# Patient Record
Sex: FEMALE
[De-identification: names, ages, dates, MRNs, and addresses within clinical notes are randomized per-mention and may not be internally consistent; named-entity substitution may affect disease eponyms.]

---

## 2021-06-24 ENCOUNTER — APPOINTMENT (OUTPATIENT)
Dept: ORTHOPEDIC SURGERY | Facility: CLINIC | Age: 41
End: 2021-06-24
Payer: COMMERCIAL

## 2021-06-24 DIAGNOSIS — J30.2 OTHER SEASONAL ALLERGIC RHINITIS: ICD-10-CM

## 2021-06-24 DIAGNOSIS — Z87.09 PERSONAL HISTORY OF OTHER DISEASES OF THE RESPIRATORY SYSTEM: ICD-10-CM

## 2021-06-24 DIAGNOSIS — Z78.9 OTHER SPECIFIED HEALTH STATUS: ICD-10-CM

## 2021-06-24 PROBLEM — Z00.00 ENCOUNTER FOR PREVENTIVE HEALTH EXAMINATION: Status: ACTIVE | Noted: 2021-06-24

## 2021-06-24 PROCEDURE — 99204 OFFICE O/P NEW MOD 45 MIN: CPT

## 2021-06-24 RX ORDER — METHYLPREDNISOLONE 4 MG/1
4 TABLET ORAL
Qty: 1 | Refills: 0 | Status: ACTIVE | COMMUNITY
Start: 2021-06-24 | End: 1900-01-01

## 2021-08-04 PROBLEM — J30.2 SEASONAL ALLERGIES: Status: RESOLVED | Noted: 2021-08-04 | Resolved: 2021-08-04

## 2021-08-04 PROBLEM — Z87.09 HISTORY OF ASTHMA: Status: RESOLVED | Noted: 2021-08-04 | Resolved: 2021-08-04

## 2021-08-04 PROBLEM — Z78.9 NON-SMOKER: Status: ACTIVE | Noted: 2021-08-04

## 2021-08-06 NOTE — PHYSICAL EXAM
[de-identified] : Physical Exam:\par \par General: patient is well developed, well nourished, in no acute \par distress, alert and oriented x 3. \par \par Mood and affect: normal\par \par Respiratory: no respiratory distress noted\par \par Skin: no scars over spine, skin intact, no erythema, increased warmth\par \par Alignment:The spine is well compensated in the coronal and sagittal plane. \par \par Gait: The patient is able to toe walk and heel walk without difficulty. \par \par Palpation: no tenderness to palpation cervical spine or paraspinal region\par \par Range of motion: Cervical spine ROM is full\par \par Neurologic Exam:\par Motor: Manual Muscle testing in the upper and lower extremities is 5 out of 5 in all muscle groups. There is no evidence of muscular atrophy in the upper extremities. Sensory: Sensation to light touch is grossly intact in the upper and lower extremities\par \par Special tests: Spurlings sign absent. \par \par \par  [de-identified] : MRI cervical 6/2021: mild multilevel degenerative changes; C5/C6 right >left posterior osteophyte with mild bilateral foraminal stenosis; no cord compression, no cord signal change

## 2021-08-06 NOTE — HISTORY OF PRESENT ILLNESS
[de-identified] : Ms. MEHTA is a very pleasant 41 year old female who complains of left shoulder and upper extremity pain for the past 4 days\par \par The patient no history of previous spine surgery.\par \par The patient has no history of unexpected weight loss, no history of active cancer, no history bladder or bowel dysfunction, no night pain, no fevers or chills.\par \par The past medical history, surgical history, family history, allergies, medications, 10+ point review of systems, family history and social history were reviewed and non contributory.\par \par

## 2021-08-06 NOTE — DISCUSSION/SUMMARY
[de-identified] : Discussed my findings with the patient. Recommending a medrol dose pack, prescription and instructions given. Follow up in 1-2 weeks if still having pain, sooner if there is an issue. All questions answered.

## 2021-10-29 RX ORDER — METHYLPREDNISOLONE 4 MG/1
4 TABLET ORAL
Qty: 1 | Refills: 0 | Status: ACTIVE | COMMUNITY
Start: 2021-10-29 | End: 1900-01-01

## 2021-11-03 ENCOUNTER — APPOINTMENT (OUTPATIENT)
Dept: ORTHOPEDIC SURGERY | Facility: CLINIC | Age: 41
End: 2021-11-03
Payer: COMMERCIAL

## 2021-11-03 DIAGNOSIS — M54.12 RADICULOPATHY, CERVICAL REGION: ICD-10-CM

## 2021-11-03 DIAGNOSIS — M48.02 SPINAL STENOSIS, CERVICAL REGION: ICD-10-CM

## 2021-11-03 PROCEDURE — 99442: CPT

## 2021-11-17 ENCOUNTER — NON-APPOINTMENT (OUTPATIENT)
Age: 41
End: 2021-11-17

## 2021-12-21 PROBLEM — M54.12 CERVICAL RADICULOPATHY: Status: ACTIVE | Noted: 2021-06-24

## 2021-12-21 PROBLEM — M48.02 FORAMINAL STENOSIS OF CERVICAL REGION: Status: ACTIVE | Noted: 2021-06-24

## 2022-04-21 ENCOUNTER — APPOINTMENT (OUTPATIENT)
Dept: ORTHOPEDIC SURGERY | Facility: CLINIC | Age: 42
End: 2022-04-21
Payer: COMMERCIAL

## 2022-04-21 VITALS — BODY MASS INDEX: 27.4 KG/M2 | HEIGHT: 69 IN | WEIGHT: 185 LBS

## 2022-04-21 DIAGNOSIS — M17.12 UNILATERAL PRIMARY OSTEOARTHRITIS, LEFT KNEE: ICD-10-CM

## 2022-04-21 DIAGNOSIS — M25.462 EFFUSION, LEFT KNEE: ICD-10-CM

## 2022-04-21 DIAGNOSIS — M23.92 UNSPECIFIED INTERNAL DERANGEMENT OF LEFT KNEE: ICD-10-CM

## 2022-04-21 PROCEDURE — 73564 X-RAY EXAM KNEE 4 OR MORE: CPT | Mod: LT

## 2022-04-21 PROCEDURE — 99213 OFFICE O/P EST LOW 20 MIN: CPT

## 2022-04-21 NOTE — HISTORY OF PRESENT ILLNESS
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 She felt a twinge of pain in the back of her knee. 3 days ago pain started to get much worse.  There was swelling.  She applied heat which helps.  Aleve is helping --she has been taking Aleve twice a day for a couple days and its starting to help. \par She localizes the pain in the back of her knee but now also has some anterior pain . She is having swelling and stiffness.\par She has a history of bilateral patella subluxations 20+ years ago.  It was always worse on the left than the right knee.  This felt different than her usual subluxation type of pain or discomfort which usually gets better quickly.   she never did any physical therapy or had any treatment in the past for her knees.\par In a few weeks she is going to [place].

## 2022-04-21 NOTE — ASSESSMENT
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  There did not seem to be tenderness medial or lateral joint line although a posterior meniscus tear is in the differential.\par I recommended icing the knee for the swelling and continuing with the Aleve 2 tablets twice a day with meals for couple weeks until the swelling comes down.  She was given a patella instability brace to wear which did provide some good support.  She was given home exercises and referred to physical therapy to work on strengthening around the knee.  If her knee is not improving in the next few weeks then she should go for an MRI to evaluate further.\par Follow-up in about a month to check and make sure it is getting better and see if other treatment is necessary.

## 2022-04-21 NOTE — PHYSICAL EXAM
[LE] : Sensory: Intact in bilateral lower extremities [Normal RLE] : Right Lower Extremity: No scars, rashes, lesions, ulcers, skin intact [Normal LLE] : Left Lower Extremity: No scars, rashes, lesions, ulcers, skin intact [Normal Touch] : sensation intact for touch [Normal] : Oriented to person, place, and time, insight and judgement were intact and the affect was normal [de-identified] : LEFT knee with right for comparison\par Mild limp not quite fully extending her left knee when walking compared to the right \par 2-3+ effusion left knee and none on the right.\par Hypermobile patella bilaterally without significant apprehension.  Increased lateral excursion left knee\par Intact extensor mechanism.\par Tender patella facets on the left.  No significant medial or lateral joint line tenderness\par Knee range of motion is 0 to 130 degrees on the left with tightness and pain on full flexion versus about 135 degrees on the right without pain or tightness.\par Ia Lachman.  Negative Lindsey.  Negative anterior and posterior drawer and normal varus and valgus laxity.\par Normal neurovascular exam distally [de-identified] : \par \par X-rays taken today of LEFT knee weightbearing 4 views showed slight irregularity in the medial femoral condyle with spurs medially and laterally on the condyle on the AP view which may be consistent with some focal degenerative changes.  Degenerative changes at the patellofemoral joint of moderate severity with lateral patella subluxation and joint space narrowing.  No fracture or acute changes